# Patient Record
Sex: MALE | Race: WHITE
[De-identification: names, ages, dates, MRNs, and addresses within clinical notes are randomized per-mention and may not be internally consistent; named-entity substitution may affect disease eponyms.]

---

## 2018-01-09 ENCOUNTER — HOSPITAL ENCOUNTER (EMERGENCY)
Dept: HOSPITAL 62 - ER | Age: 63
Discharge: HOME | End: 2018-01-09
Payer: COMMERCIAL

## 2018-01-09 VITALS — DIASTOLIC BLOOD PRESSURE: 90 MMHG | SYSTOLIC BLOOD PRESSURE: 128 MMHG

## 2018-01-09 DIAGNOSIS — R10.9: ICD-10-CM

## 2018-01-09 DIAGNOSIS — R60.0: ICD-10-CM

## 2018-01-09 DIAGNOSIS — K59.00: Primary | ICD-10-CM

## 2018-01-09 LAB
ADD MANUAL DIFF: NO
ALBUMIN SERPL-MCNC: 3.9 G/DL (ref 3.5–5)
ALP SERPL-CCNC: 96 U/L (ref 38–126)
ALT SERPL-CCNC: 20 U/L (ref 21–72)
ANION GAP SERPL CALC-SCNC: 11 MMOL/L (ref 5–19)
APPEARANCE UR: CLEAR
APTT PPP: YELLOW S
AST SERPL-CCNC: 15 U/L (ref 17–59)
BASOPHILS # BLD AUTO: 0 10^3/UL (ref 0–0.2)
BASOPHILS NFR BLD AUTO: 0.6 % (ref 0–2)
BILIRUB DIRECT SERPL-MCNC: 0.2 MG/DL (ref 0–0.4)
BILIRUB SERPL-MCNC: 0.5 MG/DL (ref 0.2–1.3)
BILIRUB UR QL STRIP: NEGATIVE
BUN SERPL-MCNC: 47 MG/DL (ref 7–20)
CALCIUM: 9.6 MG/DL (ref 8.4–10.2)
CHLORIDE SERPL-SCNC: 108 MMOL/L (ref 98–107)
CO2 SERPL-SCNC: 26 MMOL/L (ref 22–30)
EOSINOPHIL # BLD AUTO: 0.1 10^3/UL (ref 0–0.6)
EOSINOPHIL NFR BLD AUTO: 1.9 % (ref 0–6)
ERYTHROCYTE [DISTWIDTH] IN BLOOD BY AUTOMATED COUNT: 14.8 % (ref 11.5–14)
GLUCOSE SERPL-MCNC: 124 MG/DL (ref 75–110)
GLUCOSE UR STRIP-MCNC: 50 MG/DL
HCT VFR BLD CALC: 33.5 % (ref 37.9–51)
HGB BLD-MCNC: 11.2 G/DL (ref 13.5–17)
KETONES UR STRIP-MCNC: NEGATIVE MG/DL
LYMPHOCYTES # BLD AUTO: 0.7 10^3/UL (ref 0.5–4.7)
LYMPHOCYTES NFR BLD AUTO: 9.8 % (ref 13–45)
MCH RBC QN AUTO: 29.6 PG (ref 27–33.4)
MCHC RBC AUTO-ENTMCNC: 33.3 G/DL (ref 32–36)
MCV RBC AUTO: 89 FL (ref 80–97)
MONOCYTES # BLD AUTO: 0.6 10^3/UL (ref 0.1–1.4)
MONOCYTES NFR BLD AUTO: 7.8 % (ref 3–13)
NEUTROPHILS # BLD AUTO: 6.1 10^3/UL (ref 1.7–8.2)
NEUTS SEG NFR BLD AUTO: 79.9 % (ref 42–78)
NITRITE UR QL STRIP: NEGATIVE
PH UR STRIP: 5 [PH] (ref 5–9)
PLATELET # BLD: 257 10^3/UL (ref 150–450)
POTASSIUM SERPL-SCNC: 5.4 MMOL/L (ref 3.6–5)
PROT SERPL-MCNC: 6.9 G/DL (ref 6.3–8.2)
PROT UR STRIP-MCNC: >=500 MG/DL
RBC # BLD AUTO: 3.77 10^6/UL (ref 4.35–5.55)
SODIUM SERPL-SCNC: 145.4 MMOL/L (ref 137–145)
SP GR UR STRIP: 1.01
TOTAL CELLS COUNTED % (AUTO): 100 %
UROBILINOGEN UR-MCNC: NEGATIVE MG/DL (ref ?–2)
WBC # BLD AUTO: 7.6 10^3/UL (ref 4–10.5)

## 2018-01-09 PROCEDURE — 74018 RADEX ABDOMEN 1 VIEW: CPT

## 2018-01-09 PROCEDURE — 36415 COLL VENOUS BLD VENIPUNCTURE: CPT

## 2018-01-09 PROCEDURE — 99284 EMERGENCY DEPT VISIT MOD MDM: CPT

## 2018-01-09 PROCEDURE — 76705 ECHO EXAM OF ABDOMEN: CPT

## 2018-01-09 PROCEDURE — 81001 URINALYSIS AUTO W/SCOPE: CPT

## 2018-01-09 PROCEDURE — 85025 COMPLETE CBC W/AUTO DIFF WBC: CPT

## 2018-01-09 PROCEDURE — 80053 COMPREHEN METABOLIC PANEL: CPT

## 2018-01-09 NOTE — RADIOLOGY REPORT (SQ)
EXAM DESCRIPTION:  U/S ABDOMEN LIMITED W/O DOP



COMPLETED DATE/TIME:  1/9/2018 6:15 pm



REASON FOR STUDY:  Enlarged abdomen, questionable ascites



COMPARISON:  None.



TECHNIQUE:  Limited Static and real time gray scale imaging performed of the 4 abdominal quadrants an
d the midline.



LIMITATIONS:  None.



FINDINGS:  ASCITES: None identified.

OTHER: No other significant finding.



IMPRESSION:  NO EVIDENCE FOR ASCITES.



TECHNICAL DOCUMENTATION:  JOB ID:  [id number]

 2011 [employer]- All Rights Reserved

## 2018-01-09 NOTE — ER DOCUMENT REPORT
ED Medical Screen (RME)





- General


TRAVEL OUTSIDE OF THE [place] IN LAST 30 DAYS: No





<ALEJANDRO JOHNS - Last Filed: 01/09/18 14:34>





<ULYSSES RODRIGEZ - Last Filed: 01/09/18 19:07>





- General


Chief Complaint: Constipation


Stated Complaint: ABDOMINAL PAIN


Time Seen by Provider: 01/09/18 14:29


Notes: 





62-year-old male with non-insulin diabetes, hypertension.


Reports not making much urine for several days, edema to lower extremities.  


He is on an antihypertensive with hydrochlorothiazide that he has been taking.  


He has Lasix for PRN fluid retention that he was not taking until yesterday.  


Took 2 doses of Lasix 40 mg yesterday and 1 dose today.  


He reports he has been urinating now since taking Lasix.


He reports he has been drinking lots of water but not urinating, and lower 

extremity swelling.  


Since starting the Lasix yesterday he has been urinating as much as he is 

drinking, and thinks the swelling has gone down some.


He has not been moving his bowels despite drinking lots of prune juice.





He had been complaining of swelling to his left lateral hip, however it appears 

this was related to laying on a sofa on a 2x4 edge.


Quick exam shows that he has significant pitting edema to his lower extremities 

and ankles.








I have greeted and performed a rapid initial assessment of this patient.  A 

comprehensive ED assessment and evaluation of the patient, analysis of test 

results and completion of the medical decision making process will be conducted 

by additional ED providers. (ALEJANDRO JOHNS)





- Related Data


Allergies/Adverse Reactions: 


 





No Known Allergies Allergy (Verified 01/09/18 14:27)


 











Past Medical History





- Past Medical History


Cardiac Medical History: Reports: Hx Hypercholesterolemia, Hx Hypertension


Endocrine Medical History: Reports: Hx Diabetes Mellitus Type 2


Psychiatric Medical History: Reports: Hx Depression





<ALEJANDRO JOHNS - Last Filed: 01/09/18 14:34>





- Vital signs


Vitals: 





 











Temp Pulse Resp BP Pulse Ox


 


 98.5 F   81   22 H  181/68 H  92 


 


 01/09/18 13:51  01/09/18 13:51  01/09/18 13:51  01/09/18 13:51  01/09/18 13:51














Course





- Laboratory


Result Diagrams: 


 01/09/18 15:11





 01/09/18 15:11





<ULYSSES RODRIGEZ - Last Filed: 01/09/18 19:07>





- Vital Signs


Vital signs: 





 











Temp Pulse Resp BP Pulse Ox


 


 97.6 F   64   19   150/71 H  92 


 


 01/09/18 18:33  01/09/18 18:33  01/09/18 18:33  01/09/18 18:33  01/09/18 18:33














- Laboratory


Laboratory results interpreted by me: 





 











  01/09/18 01/09/18 01/09/18





  15:11 15:11 15:11


 


RBC  3.77 L  


 


Hgb  11.2 L  


 


Hct  33.5 L  


 


RDW  14.8 H  


 


Seg Neutrophils %  79.9 H  


 


Lymphocytes %  9.8 L  


 


Sodium   145.4 H 


 


Potassium   5.4 H 


 


Chloride   108 H 


 


BUN   47 H 


 


Creatinine   2.08 H 


 


Est GFR ( Amer)   39 L 


 


Est GFR (Non-Af Amer)   32 L 


 


Glucose   124 H 


 


AST   15 L 


 


ALT   20 L 


 


Urine Protein    >=500 H


 


Urine Glucose (UA)    50 H


 


Urine Blood    SMALL H














Doctor's Discharge





<ALEJANDRO JOHNS - Last Filed: 01/09/18 14:34>





<ULYSSES RODRIGEZ - Last Filed: 01/09/18 19:07>





- Discharge


Clinical Impression: 


 Abdominal pain, Constipation, Peripheral edema





Condition: Stable


Disposition: HOME, SELF-CARE


Additional Instructions: 


ABDOMINAL PAIN:





     There are many causes of abdominal pain.  Pain can mean a serious problem 

requiring surgery (such as appendicitis). It can also be an innocent problem 

that goes away on its own (such as a viral infection).  Often, time must pass 

to determine the cause of pain.


     The physician does not feel that hospitalization is necessary, at present. 

Things may change within the next 24 hours. Call the doctor or come back for re-

examination if any problems occur, such as:


     (1) Pain that becomes more severe, steady, or becomes concentrated in one 

specific area.  Also, pain that is more severe with movement or coughing.  


     (2) Vomiting that persists or becomes more frequent.  


     (3) Blood in the vomitus, urine, or bowel movements.  Blood in the stool 

may have a tarry or black appearance.


     (4) Shaking chills or fever greater than 100 degrees F. 


     (5) The abdomen becomes more distended or swollen. 


     (6) Bowel movements cease. 


     (7) Failure to improve as expected.








NORMAL EXAM AND WORKUP:


     At this time, your examination and workup show no significant abnormality.

  No significant abnormal physical findings are noted.  All laboratory, EKG, 

and imaging (x-ray, CT scans, ultrasound) studies that were ordered show no 

significant abnormality.


     Although your examination and all studies that were ordered showed no 

significant abnormal finding, there are no examinations and no studies that are 

100% accurate.  There is always the possibility that some abnormality could 

exist and not be detected with physical examination or within the limits and 

capabilities of laboratory and other studies.


     You should return or follow up as you were instructed on your visit today 

for further evaluation if your symptoms do not resolve.








CONSTIPATION:





     Constipation is a common problem.  It is especially likely as you get 

older.  Constipation is a common cause of abdominal pain, but sometimes causes 

no symptoms at all.  Causes of constipation include certain medications, 

dehydration, diets, inactivity, and low-fiber intake.  Rarely, it can be a 

symptom of underlying disease.  The physician has evaluated you for this.


     Avoid constipation by eating a diet high in fiber, fruits, and vegetables.

  Drink plenty of liquids.  Get regular exercise.  If possible, avoid 

constipating medicines like narcotic pain medication.  Some vitamin tablets can 

cause constipation.  Stool softeners may be needed for difficult cases.  An 

excellent stool softener is Konsyl which is available at [employer] drug store.  Just add a teaspoon to a glass of pineapple or orange 

juice daily or twice a day if needed.


   Laxatives are useful for occasional constipation.  You should use them only 

when necessary.  Too-frequent use can make your bowels dependent on them.  Some 

over the counter laxatives available without prescription are:





   Milk of Magnesia, 1-2 tablespoons twice a day


    Dulcolax, 5 mg pill or 10 mg suppository.


   Citrate of Magnesia, 4-5 ounces a day for a day or two





For acute constipation, Fleet's Enemas and Dulcolax suppositories are helpful.


     Chronic, long term  use of laxatives or enemas is not a good idea.  Your 

bowel may become dependant on them.  You do not need to have a bowel movement 

every day.  Many people do fine with a bowel movement every three or four days.


     You should call your doctor or return for re-evaluation if you pass blood 

in the stool, or if you develop fever or increasing abdominal pain.








BULK LAXATIVES: 


     Bulk laxatives make the stool softer and bulkier.  They're useful for 

preventing constipation.  You can choose between psyllium, methylcellulose, and 

polycarbophil.  They are available without a prescription.


     Psyllium brand names include Konsyl, Metamucil, Perdiem, Effer-Syllium and 

Hydrocil.  It's available as powder, flavored drink powder, or chewable. The 

usual dose of psyllium powder is one heaping teaspoon in water each morning, 

increasing to twice a day if needed.  Orange juice can disguise the slightly 

grainy texture.


     Methylcellulose is marketed as Citrucel and other brands.  The average 

dose is two grams in a cup of water one to three times a day.


     Polycarbophil is marketed as Fiber-Con.  Take two tablets with a cup of 

water one to three times a day.








LAXATIVE:


     A laxative agent has been prescribed for your condition.  This should 

result in passage of stool within 12 hours.


     Some mild intestinal cramping is common as the hard stool begins to move.  

You may have loose or runny stools for a short time.


     Contact your doctor if there is severe cramping, vomiting, or passage of 

blood.  Return for further care if this medicine fails to improve your 

condition.





Edema, Peripheral


     You have swelling in your legs. This is called peripheral edema. It can be 

caused by "leaky capillaries," inflammation, disease of the leg veins, or 

excess salt and water in your body. Edema may be a sign of heart, kidney, or 

liver disease. A medical evaluation can determine if there is a serious 

underlying cause for your edema.


     Avoid prolonged standing. If you must sit for a long time, occasionally 

get up and walk around or elevate your legs. Support stockings can be helpful 

in limiting swelling. Often diuretic or water pills are used to remove excess 

salt and water from your body.


     Call the doctor or return if you develop increased swelling, pain, or 

redness, shortness of breath, chest pain, or any other significant change.





You may continue to take your Lasix (furosemide), 1 tablet daily for the fluid 

collection in your legs.





FOLLOW-UP CARE:


If you have been referred to a physician for follow-up care, call the physician

s office for an appointment as you were instructed or within the next two days.

  If you experience worsening or a significant change in your symptoms, notify 

the physician immediately or return to the Emergency Department at any time for 

re-evaluation.

## 2018-01-09 NOTE — RADIOLOGY REPORT (SQ)
EXAM DESCRIPTION:  KUB/ABDOMEN (SINGLE VIEW)



COMPLETED DATE/TIME:  1/9/2018 3:32 pm



REASON FOR STUDY:  constipation



COMPARISON:  None.



NUMBER OF VIEWS:  One view.



TECHNIQUE:   Supine radiographic image of the abdomen acquired.



LIMITATIONS:  Large patient, 4 cassettes



FINDINGS:  BOWEL GAS PATTERN: Normal bowel gas pattern. No dilated loops.

CALCIFICATIONS: No suspicious calcifications.

SOFT TISSUES: No gross mass or suggestion of organomegaly.

HARDWARE: None in the abdomen.

BONES: No acute fracture.  Disc space loss of height in the lower thoracic and upper lumbar spine wit
h bony spurring

OTHER: No other significant finding.



IMPRESSION:  NO RADIOGRAPHIC EVIDENCE FOR ACUTE ABDOMINAL DISEASE.

No significant constipation



TECHNICAL DOCUMENTATION:  JOB ID:  [id number]

 2011 [employer]- All Rights Reserved

## 2018-01-09 NOTE — ER DOCUMENT REPORT
ED GI/





- General


Chief Complaint: Constipation


Stated Complaint: ABDOMINAL PAIN


Time Seen by Provider: 01/09/18 14:29


Notes: 





Patient says he feels "bloated" and has not had a bowel movement in 3-1/2 days.

  He says that his stomach is enlarged and pushing his abdomen upward making it 

difficult for him to get a good deep breath.  Denies any nausea or vomiting and 

has not had any diarrhea.  Has not seen any blood in his stools.  Patient also 

says he has fluid in both of his legs all the way up to the hips.  He is on 

Lasix which he takes as he feels needed.  Denies any chest pains.  Denies any 

fever.  Has not had any abdominal surgeries.








TRAVEL OUTSIDE OF THE [place] IN LAST 30 DAYS: No





- Related Data


Allergies/Adverse Reactions: 


 





No Known Allergies Allergy (Verified 01/09/18 14:27)


 











Past Medical History





- Social History


Smoking Status: Never Smoker


Chew tobacco use (# tins/day): No


Frequency of alcohol use: None


Drug Abuse: None


Family History: Reviewed & Not Pertinent


Patient has suicidal ideation: No


Patient has homicidal ideation: No





- Past Medical History


Cardiac Medical History: Reports: Hx Hypercholesterolemia, Hx Hypertension, 

Other - Patient had a stress test, cardiac cath 6 months ago that were normal


Endocrine Medical History: Reports: Hx Diabetes Mellitus Type 2


Psychiatric Medical History: Reports: Hx Depression


Surgical Hx: Negative


Past Surgical History: Reports: None





Review of Systems





- Review of Systems


Notes: 





REVIEW OF SYSTEMS:





CONSTITUTIONAL :  Denies fever.


  


EENT:   Denies eye, ear, nose or mouth or throat pain or other symptoms.





CARDIOVASCULAR:  Denies chest pain.





RESPIRATORY:  Denies cough, chest congestion, or shortness of breath, just 

difficulty getting a deep breath because of his abdomen being "bloated".





GASTROINTESTINAL: Has some generalized abdominal pain but no nausea, vomiting, 

or diarrhea.  Scratches on the abdomen from where the patient has been 

scratching.





GENITOURINARY:  Denies difficulty or painful urinating, urinary frequency, 

blood in urine.





MUSCULOSKELETAL:  Denies back or neck pain.  Denies joint pain or swelling.





SKIN:   Denies rash or skin lesions.





NEUROLOGICAL:  Denies LOC or altered mental status.  Denies headache.  Denies 

sensory loss or motor deficits.





ALL OTHER SYSTEMS REVIEWED AND NEGATIVE.





Physical Exam





- Vital signs


Vitals: 


 











Temp Pulse Resp BP Pulse Ox


 


 98.5 F   81   22 H  181/68 H  92 


 


 01/09/18 13:51  01/09/18 13:51  01/09/18 13:51  01/09/18 13:51  01/09/18 13:51











Interpretation: Normal, Hypertensive - Mild





- Notes


Notes: 





PHYSICAL EXAMINATION:





GENERAL: Well-appearing, in no acute distress.





HEAD: Atraumatic, normocephalic.





EYES: Pupils equal round and reactive to light, extraocular movements intact.





ENT: oropharynx clear without exudates.  Moist mucous membranes.





NECK: Normal range of motion, supple.





LUNGS: Breath sounds clear and equal bilaterally.  O2 sat 97%





HEART: Regular rate and rhythm without murmurs.





ABDOMEN: Soft, very large abdomen, but soft.  Not sure if this is ascites 

present.





BACK:  No tenderness throughout entire back.





EXTREMITIES: Normal range of motion without pain.





NEUROLOGICAL:  Normal speech, normal gait.  Normal sensory, motor, and reflex 

exams.  Awake, alert, and oriented x3.  Cranial nerves normal.





PSYCH: Normal mood, normal affect.





SKIN: Warm, dry, no rashes.





Course





- Re-evaluation


Re-evalutation: 





01/09/18 20:28


Patient was given a detailed instructions on handling constipation.  I went 

through this in detail with the patient.  I recommended that he get milk of 

magnesia, Dulcolax suppositories, and a bottle or 2 of magnesium citrate and he 

would likely get very pump results from taking them tonight.





Patient wishes to be admitted to the hospital.  I told him that this is not an 

indication for admission and we are experiencing very overloading conditions.  

Then, patient says that all of his pipes in his home are frozen edema be able 

to clean himself up after giving himself these medications.  I told him I was 

sorry but that is not an indication for admission.  Then finally, patient 

wanted to know if we could give him the medications (milk of magnesia, Dulcolax

, magnesium citrate).  And I told him that we were not able to dispense those 

medications and he would have to get them at a local drugstore or grocery 

store.  I told him if he tried those 3 things and they did not work by tomorrow 

morning, he could return and we can reevaluate and consider other treatments 

such as enemas, etc.  Patient was not happy with my treatment plan.





- Vital Signs


Vital signs: 


 











Temp Pulse Resp BP Pulse Ox


 


 98.0 F   66   20   128/90 H  90 L


 


 01/09/18 19:57  01/09/18 19:57  01/09/18 19:57  01/09/18 19:57  01/09/18 19:57














- Laboratory


Result Diagrams: 


 01/09/18 15:11





 01/09/18 15:11


Laboratory results interpreted by me: 


 











  01/09/18 01/09/18 01/09/18





  15:11 15:11 15:11


 


RBC  3.77 L  


 


Hgb  11.2 L  


 


Hct  33.5 L  


 


RDW  14.8 H  


 


Seg Neutrophils %  79.9 H  


 


Lymphocytes %  9.8 L  


 


Sodium   145.4 H 


 


Potassium   5.4 H 


 


Chloride   108 H 


 


BUN   47 H 


 


Creatinine   2.08 H 


 


Est GFR ( Amer)   39 L 


 


Est GFR (Non-Af Amer)   32 L 


 


Glucose   124 H 


 


AST   15 L 


 


ALT   20 L 


 


Urine Protein    >=500 H


 


Urine Glucose (UA)    50 H


 


Urine Blood    SMALL H














- Diagnostic Test


Radiology reviewed: Image reviewed, Reports reviewed - Ultrasound does not show 

any evidence of ascites. Abdominal x-rays do not show any acute abnormality.  

No evidence of bowel obstruction.





Discharge





- Discharge


Clinical Impression: 


 Abdominal pain, Constipation, Peripheral edema





Condition: Stable


Disposition: HOME, SELF-CARE


Additional Instructions: 


ABDOMINAL PAIN:





     There are many causes of abdominal pain.  Pain can mean a serious problem 

requiring surgery (such as appendicitis). It can also be an innocent problem 

that goes away on its own (such as a viral infection).  Often, time must pass 

to determine the cause of pain.


     The physician does not feel that hospitalization is necessary, at present. 

Things may change within the next 24 hours. Call the doctor or come back for re-

examination if any problems occur, such as:


     (1) Pain that becomes more severe, steady, or becomes concentrated in one 

specific area.  Also, pain that is more severe with movement or coughing.  


     (2) Vomiting that persists or becomes more frequent.  


     (3) Blood in the vomitus, urine, or bowel movements.  Blood in the stool 

may have a tarry or black appearance.


     (4) Shaking chills or fever greater than 100 degrees F. 


     (5) The abdomen becomes more distended or swollen. 


     (6) Bowel movements cease. 


     (7) Failure to improve as expected.








NORMAL EXAM AND WORKUP:


     At this time, your examination and workup show no significant abnormality.

  No significant abnormal physical findings are noted.  All laboratory, EKG, 

and imaging (x-ray, CT scans, ultrasound) studies that were ordered show no 

significant abnormality.


     Although your examination and all studies that were ordered showed no 

significant abnormal finding, there are no examinations and no studies that are 

100% accurate.  There is always the possibility that some abnormality could 

exist and not be detected with physical examination or within the limits and 

capabilities of laboratory and other studies.


     You should return or follow up as you were instructed on your visit today 

for further evaluation if your symptoms do not resolve.








CONSTIPATION:





     Constipation is a common problem.  It is especially likely as you get 

older.  Constipation is a common cause of abdominal pain, but sometimes causes 

no symptoms at all.  Causes of constipation include certain medications, 

dehydration, diets, inactivity, and low-fiber intake.  Rarely, it can be a 

symptom of underlying disease.  The physician has evaluated you for this.


     Avoid constipation by eating a diet high in fiber, fruits, and vegetables.

  Drink plenty of liquids.  Get regular exercise.  If possible, avoid 

constipating medicines like narcotic pain medication.  Some vitamin tablets can 

cause constipation.  Stool softeners may be needed for difficult cases.  An 

excellent stool softener is Konsyl which is available at [employer] and 

[employer] drug store.  Just add a teaspoon to a glass of pineapple or orange 

juice daily or twice a day if needed.


   Laxatives are useful for occasional constipation.  You should use them only 

when necessary.  Too-frequent use can make your bowels dependent on them.  Some 

over the counter laxatives available without prescription are:





   Milk of Magnesia, 1-2 tablespoons twice a day


    Dulcolax, 5 mg pill or 10 mg suppository.


   Citrate of Magnesia, 4-5 ounces a day for a day or two





For acute constipation, Fleet's Enemas and Dulcolax suppositories are helpful.


     Chronic, long term  use of laxatives or enemas is not a good idea.  Your 

bowel may become dependant on them.  You do not need to have a bowel movement 

every day.  Many people do fine with a bowel movement every three or four days.


     You should call your doctor or return for re-evaluation if you pass blood 

in the stool, or if you develop fever or increasing abdominal pain.








BULK LAXATIVES: 


     Bulk laxatives make the stool softer and bulkier.  They're useful for 

preventing constipation.  You can choose between psyllium, methylcellulose, and 

polycarbophil.  They are available without a prescription.


     Psyllium brand names include Konsyl, Metamucil, Perdiem, Effer-Syllium and 

Hydrocil.  It's available as powder, flavored drink powder, or chewable. The 

usual dose of psyllium powder is one heaping teaspoon in water each morning, 

increasing to twice a day if needed.  Orange juice can disguise the slightly 

grainy texture.


     Methylcellulose is marketed as Citrucel and other brands.  The average 

dose is two grams in a cup of water one to three times a day.


     Polycarbophil is marketed as Fiber-Con.  Take two tablets with a cup of 

water one to three times a day.








LAXATIVE:


     A laxative agent has been prescribed for your condition.  This should 

result in passage of stool within 12 hours.


     Some mild intestinal cramping is common as the hard stool begins to move.  

You may have loose or runny stools for a short time.


     Contact your doctor if there is severe cramping, vomiting, or passage of 

blood.  Return for further care if this medicine fails to improve your 

condition.





Edema, Peripheral


     You have swelling in your legs. This is called peripheral edema. It can be 

caused by "leaky capillaries," inflammation, disease of the leg veins, or 

excess salt and water in your body. Edema may be a sign of heart, kidney, or 

liver disease. A medical evaluation can determine if there is a serious 

underlying cause for your edema.


     Avoid prolonged standing. If you must sit for a long time, occasionally 

get up and walk around or elevate your legs. Support stockings can be helpful 

in limiting swelling. Often diuretic or water pills are used to remove excess 

salt and water from your body.


     Call the doctor or return if you develop increased swelling, pain, or 

redness, shortness of breath, chest pain, or any other significant change.





You may continue to take your Lasix (furosemide), 1 tablet daily for the fluid 

collection in your legs.





FOLLOW-UP CARE:


If you have been referred to a physician for follow-up care, call the physician

s office for an appointment as you were instructed or within the next two days.

  If you experience worsening or a significant change in your symptoms, notify 

the physician immediately or return to the Emergency Department at any time for 

re-evaluation.